# Patient Record
Sex: FEMALE | Race: WHITE
[De-identification: names, ages, dates, MRNs, and addresses within clinical notes are randomized per-mention and may not be internally consistent; named-entity substitution may affect disease eponyms.]

---

## 2018-04-17 ENCOUNTER — HOSPITAL ENCOUNTER (OUTPATIENT)
Dept: HOSPITAL 68 - STS | Age: 63
End: 2018-04-17
Attending: PODIATRIST
Payer: COMMERCIAL

## 2018-04-17 VITALS — WEIGHT: 170 LBS | BODY MASS INDEX: 31.28 KG/M2 | HEIGHT: 62 IN

## 2018-04-17 DIAGNOSIS — Z79.899: ICD-10-CM

## 2018-04-17 DIAGNOSIS — Z94.4: ICD-10-CM

## 2018-04-17 DIAGNOSIS — F17.200: ICD-10-CM

## 2018-04-17 DIAGNOSIS — I10: ICD-10-CM

## 2018-04-17 DIAGNOSIS — M20.41: Primary | ICD-10-CM

## 2018-04-17 DIAGNOSIS — M25.871: ICD-10-CM

## 2018-04-17 NOTE — OPERATIVE REPORT
Operative/Inv Procedure Report
Surgery Date: 04/17/18
Name of Procedure:
1 arthroplasty fourth toe right foot
2 arthroplasty fifth toe right foot
3 excision of tibial sesamoid right foot
Pre-Operative Diagnosis:
1 painful hammertoe fourth toe right foot
2 painful hammertoe fifth toe right foot
3 arthritic sesamoid right foot
Post-Operative Diagnosis:
The same
Estimated Blood Loss: scant
Surgeon/Assistant:
Sumit CORRIGAN,Bryan Bradley DPM
Anesthesia: moderate sedation, block
 
Operative/Procedure Note
Note:
After obtaining informed consent the patient was brought to the operating room 
and placed on the operating table in the supine position.  The patient isn't 
securely fastened to the operating table utilizing a safety belt.  After 
administration of IV sedation, 10 mL of 0.5% Marcaine plain was infiltrated 
about the patient's right ankle.  A well-padded ankle tourniquet was placed 
about the patient's right lower extremity.  Right foot and ankle within scrubbed
prepped and draped in usual aseptic manner.  2 g of Ancef were delivered 
intravenously times one dose.  The right lower extremity was elevated to examine
to limb, which point the ankle tourniquet inflated 250 mmHg.  Attention directed
dorsal aspect of the fourth and fifth digits, where 2 transversely oriented 
semielliptical incisions centered over the proximal phalangeal joint were 
incised with 15 blade.  The ellipses of skin were freed and passed from the 
operative field.  Extensor tenotomies were then performed exposing the heads of 
the proximal phalanges of the fourth fifth digits.  Sagittal bone saw was 
utilized to resect the heads fourth and fifth.  The extensor tendon was then 
reapproximated 4-0 Vicryl and the skin edges reapproximated 4-0 nylon.  
Attention was then directed to the medial foot, where a linear incision was made
at the junction the dorsal plantar skin at the first metatarsal head.  
Dissection was then carried down to the capture structures were linear 
capsulotomy was performed.  The dissection was then carried down to the tibial 
sesamoid which was identified and noted to be bipartite and arthritic.  It was 
then shelled out and passed from the operative field.  The wound was irrigated 
cuff Svensson normal sterile saline.  The capture structures reapproximated 4-0 
Vicryl and the subtenons tissues reports a 4-0 Vicryl.  The skin edges were then
reapproximated with 4-0 nylon.  The incisions were dressed with Xeroform 4 x 4's
Kerlix and an Ace wrap.  Patient is noted tolerate both procedure and anesthesia
well and the patient was transported from the operating room to recovery with 
vital signs stable best assess intact all digits right foot.  Please cc a copy 
of this dictation to Brian Bradley DPM.

## 2018-04-17 NOTE — HISTORY & PHYSICAL PRE-OP
General Information and HPI
History of Present Illness:
Melissa is a 63-year-old female with a long-standing and worsening complaint of 
painful hammertoes involving the fourth fifth digits right foot.  Patient also 
complains of an arthritic medial sesamoid right foot.  Patient is undergone an 
extended course of conservative care, including shoe gear and activity 
modification, rest, immobilization and courses of NSAIDs.  None of this is 
yielded her any significant relief.  The patient presents today for preoperative
surgical consultation.  A trough from Brian Bradley DPM.
 
Allergies/Medications
Allergies:
Coded Allergies:
aspirin (LIVER TRANSPLANT 04/12/18)
codeine (UPSET STOMACH 04/12/18)
erythromycin base (YEAST INFECTIONS 04/13/18)
Uncoded Allergies:
BANDAID ADHESIVE (WHITE PIMPLES 04/12/18)
 
Home Med list
Ascorbic Acid (Vitamin C) 500 MG TABLET   1 TAB PO DAILY SUPPLEMENT  (Reported)
Atenolol 100 MG TABLET   1 TAB PO DAILY HEART  (Reported)
Biotin (Pawel Biotin) 10,000 MCG CAPSULE   1 CAP PO DAILY SUPPLEMENT  (Reported)
Cholecalciferol (Vitamin D3) (Vitamin D) 2,000 UNIT CAPSULE   1 CAP PO DAILY 
SUPPLEMENT  (Reported)
Cyclosporine 25 MG CAPSULE   1 CAP PO QPM TRANSPLANT  (Reported)
Cyclosporine 25 MG CAPSULE   2 CAP PO QAM TRANSPLANT  (Reported)
Hydralazine HCl 25 MG TABLET   1 TAB PO QAM BP  (Reported)
Methadone HCl 5 MG/5 ML SOLUTION   40 MG PO DAILY MAINTENECE  (Reported)
Multivitamin (Animal Chews) 1 EACH TAB.CHEW   1 TAB PO DAILY SUPPLEMENT  (
Reported)
 
 
Past History
 
Medical History
Cardiovascular: hypertension, hyperlipidemia
 
Surgical History
Pertinent Surgical History: cholecystectomy, LIVER TRANSPLANT
 
Review of Systems
Review of Systems:
Unremarkable except noted in history of present illness
 
Exam & Diagnostic Data
Physical Exam:
Lungs clear bilaterally.  Heart sounds rate and rhythm regular.  Lower extremity
physical exam demonstrates intact pedal pulses bilaterally.  Pulses dorsalis 
pedis and posterior tibial arteries are palpable bilaterally.  Patient without 
any sensory motor deficits.  Deep tendon reflexes grossly intact.  Patient noted
to have pain with palpation to the dorsal aspect of the fourth and fifth digits 
right foot.  Patient also noted to have cemented pain overlying the medial 
sesamoid plantarly of the right foot.
 
Assessment/Plan
Assessment/Plan:
Painful hammertoes right foot.  A lengthy discussion reviewing both surgical and
conservative options was held the patient at bedside and the patient elects to 
go forward with surgery despite the risks.
 
As Ranked By This Provider
Problem List:
 1. Other hammer toe(s) (acquired), right foot
 
 
Attending MD Review Statement
 
Attending Statement
Attending MD Statement: examined this patient